# Patient Record
Sex: MALE | Race: OTHER | NOT HISPANIC OR LATINO
[De-identification: names, ages, dates, MRNs, and addresses within clinical notes are randomized per-mention and may not be internally consistent; named-entity substitution may affect disease eponyms.]

---

## 2020-01-03 ENCOUNTER — TRANSCRIPTION ENCOUNTER (OUTPATIENT)
Age: 25
End: 2020-01-03

## 2023-04-10 ENCOUNTER — APPOINTMENT (OUTPATIENT)
Dept: NEUROSURGERY | Facility: CLINIC | Age: 28
End: 2023-04-10
Payer: COMMERCIAL

## 2023-04-10 VITALS — HEIGHT: 70 IN | WEIGHT: 195 LBS | BODY MASS INDEX: 27.92 KG/M2

## 2023-04-10 DIAGNOSIS — Z78.9 OTHER SPECIFIED HEALTH STATUS: ICD-10-CM

## 2023-04-10 DIAGNOSIS — Z87.891 PERSONAL HISTORY OF NICOTINE DEPENDENCE: ICD-10-CM

## 2023-04-10 DIAGNOSIS — M54.6 PAIN IN THORACIC SPINE: ICD-10-CM

## 2023-04-10 PROBLEM — Z00.00 ENCOUNTER FOR PREVENTIVE HEALTH EXAMINATION: Status: ACTIVE | Noted: 2023-04-10

## 2023-04-10 PROCEDURE — 99204 OFFICE O/P NEW MOD 45 MIN: CPT

## 2023-04-10 NOTE — ADDENDUM
[FreeTextEntry1] : [facility] completed [facility], 4[facility]10/23. \par \par Xray results given to patient. Xray thoracic - normal. \par \par Report scanned into chart.\par \par Patient will follow-up in 6 weeks / PRN.

## 2023-04-10 NOTE — HISTORY OF PRESENT ILLNESS
[de-identified] : CHIEF COMPLAINT: Mr. BELTRAN has been experiencing intermittent episodes of left sided intrascapular pain/spasms over the years.  Unfortunately he had an exacerbation a week ago.  He denies recent trauma.  He states he had developed recurrent pain after doing a certain stretching exercise at the gym recently.  He has been using Aleve and a heating pad which has provided relief.  He is relatively asymptomatic today and feeling much better. No bowel / bladder dysfunction.  No recent physical therapy.  No imaging to review today.\par \par PHYSICAL EXAM: \par Constitutional: Well appearing, no distress\par HEENT: Normocephalic Atraumatic\par Psychiatric: Alert and oriented to all spheres, normal mood\par Pulmonary: No respiratory distress\par Neurologic: \par CN II-XII grossly intact\par Palpation: mild left intrascapular tenderness. \par Strength: Full strength in all major muscle groups\par Sensation: Full sensation to light touch in all extremities\par Reflexes: \par  2+ patellar\par  2+ ankle jerk\par Full ROM cervical / thoracic / lumbar spine. \par Signs:\par SLR negative\par Gait: steady, walking w/o assistance. \par \par \par \par

## 2023-04-10 NOTE — ASSESSMENT
[FreeTextEntry1] : Mr. BELTRAN will proceed with x-rays of the thoracic spine to assess spinal alignment.  I believe his symptoms may be related to his posture.  I have given him home stretching exercises to do.  We have discussed modifications on his workout routine at the gym also.  Since symptoms have improved he does not feel formal physical therapy is needed.  He will also modify his posture at work and consider using his standing desk at times and when he is sitting a lumbar support cushion.  I will call him with his x-ray results.  Further follow-up will be discussed at that time.  He is agreeable with the plan of care.  Will call barring any issues.\par \par \par